# Patient Record
Sex: MALE | Race: BLACK OR AFRICAN AMERICAN | NOT HISPANIC OR LATINO | URBAN - METROPOLITAN AREA
[De-identification: names, ages, dates, MRNs, and addresses within clinical notes are randomized per-mention and may not be internally consistent; named-entity substitution may affect disease eponyms.]

---
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
## 2024-09-17 NOTE — ED PEDIATRIC NURSE NOTE - GASTROINTESTINAL WDL
Spoke to patient and scheduled one year follow up video visit.    Abdomen soft, nontender, nondistended, bowel sounds present in all 4 quadrants.